# Patient Record
Sex: MALE | Race: WHITE
[De-identification: names, ages, dates, MRNs, and addresses within clinical notes are randomized per-mention and may not be internally consistent; named-entity substitution may affect disease eponyms.]

---

## 2017-05-10 ENCOUNTER — HOSPITAL ENCOUNTER (EMERGENCY)
Dept: HOSPITAL 35 - ER | Age: 82
LOS: 1 days | Discharge: HOME | End: 2017-05-11
Payer: COMMERCIAL

## 2017-05-10 VITALS — WEIGHT: 164.99 LBS | BODY MASS INDEX: 23.62 KG/M2 | HEIGHT: 70 IN

## 2017-05-10 DIAGNOSIS — F32.9: ICD-10-CM

## 2017-05-10 DIAGNOSIS — Z90.49: ICD-10-CM

## 2017-05-10 DIAGNOSIS — E03.9: ICD-10-CM
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
## 2017-05-10 DIAGNOSIS — Z85.46: ICD-10-CM
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
## 2017-05-10 DIAGNOSIS — Z98.890: ICD-10-CM

## 2017-05-10 DIAGNOSIS — F10.99: ICD-10-CM
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
## 2017-05-10 DIAGNOSIS — S20.212A: Primary | ICD-10-CM
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
## 2017-05-10 DIAGNOSIS — E11.9: ICD-10-CM

## 2020-11-05 ENCOUNTER — HOSPITAL ENCOUNTER (OUTPATIENT)
Dept: HOSPITAL 35 - RAD | Age: 85
End: 2020-11-05
Attending: FAMILY MEDICINE
Payer: COMMERCIAL

## 2020-11-05 DIAGNOSIS — R06.00: Primary | ICD-10-CM

## 2021-05-27 ENCOUNTER — HOSPITAL ENCOUNTER (EMERGENCY)
Dept: HOSPITAL 35 - ER | Age: 86
Discharge: HOME | End: 2021-05-27
Payer: COMMERCIAL

## 2021-05-27 VITALS — WEIGHT: 170 LBS | BODY MASS INDEX: 24.34 KG/M2 | HEIGHT: 70 IN

## 2021-05-27 VITALS — DIASTOLIC BLOOD PRESSURE: 58 MMHG | SYSTOLIC BLOOD PRESSURE: 121 MMHG

## 2021-05-27 DIAGNOSIS — E11.9: ICD-10-CM

## 2021-05-27 DIAGNOSIS — M54.2: ICD-10-CM

## 2021-05-27 DIAGNOSIS — R07.89: Primary | ICD-10-CM

## 2021-05-27 DIAGNOSIS — Z79.899: ICD-10-CM

## 2021-05-27 DIAGNOSIS — E03.9: ICD-10-CM

## 2021-05-27 LAB
ALBUMIN SERPL-MCNC: 3.8 G/DL (ref 3.4–5)
ALT SERPL-CCNC: 22 U/L (ref 16–63)
ANION GAP SERPL CALC-SCNC: 12 MMOL/L (ref 7–16)
AST SERPL-CCNC: 13 U/L (ref 15–37)
BASOPHILS NFR BLD AUTO: 1 % (ref 0–2)
BILIRUB SERPL-MCNC: 0.6 MG/DL (ref 0.2–1)
BUN SERPL-MCNC: 24 MG/DL (ref 7–18)
CALCIUM SERPL-MCNC: 9.3 MG/DL (ref 8.5–10.1)
CHLORIDE SERPL-SCNC: 103 MMOL/L (ref 98–107)
CO2 SERPL-SCNC: 26 MMOL/L (ref 21–32)
CREAT SERPL-MCNC: 1.7 MG/DL (ref 0.7–1.3)
EOSINOPHIL NFR BLD: 0.8 % (ref 0–3)
ERYTHROCYTE [DISTWIDTH] IN BLOOD BY AUTOMATED COUNT: 13.3 % (ref 10.5–14.5)
GLUCOSE SERPL-MCNC: 273 MG/DL (ref 74–106)
GRANULOCYTES NFR BLD MANUAL: 74.4 % (ref 36–66)
HCT VFR BLD CALC: 35.1 % (ref 42–52)
HGB BLD-MCNC: 12.1 GM/DL (ref 14–18)
LYMPHOCYTES NFR BLD AUTO: 18.1 % (ref 24–44)
MCH RBC QN AUTO: 30.8 PG (ref 26–34)
MCHC RBC AUTO-ENTMCNC: 34.5 G/DL (ref 28–37)
MCV RBC: 89.4 FL (ref 80–100)
MONOCYTES NFR BLD: 5.7 % (ref 1–8)
NEUTROPHILS # BLD: 6.3 THOU/UL (ref 1.4–8.2)
PLATELET # BLD: 208 THOU/UL (ref 150–400)
POTASSIUM SERPL-SCNC: 4.3 MMOL/L (ref 3.5–5.1)
PROT SERPL-MCNC: 7.2 G/DL (ref 6.4–8.2)
RBC # BLD AUTO: 3.92 MIL/UL (ref 4.5–6)
SODIUM SERPL-SCNC: 141 MMOL/L (ref 136–145)
TROPONIN I SERPL-MCNC: <0.06 NG/ML (ref ?–0.06)
WBC # BLD AUTO: 8.5 THOU/UL (ref 4–11)

## 2021-05-28 NOTE — EKG
[address] [employer]
[place]  [address]
Phone:  [phone]                    ELECTROCARDIOGRAM REPORT      
_______________________________________________________________________________
 
Name:       HECTOR NUÑEZ OLMAN            [facility] #:                     [id number]     Account #:      [id number]  
Admission:  21    Attend Phys:                          
Discharge:  21    Date of Birth:  [birth year]  
                                                          Report #: [id number]
   [id number]
_______________________________________________________________________________
                         [facility] ED
                                       
Test Date:    2021               Test Time:    15:48:02
Pat Name:     HECTOR NUÑEZ         Department:   
Patient ID:   [id number]            Room:          
Gender:       M                        Technician:   unknown
:          1935               Requested By: Zunilda Blum
Order Number: [id number] MD:   Win Larsen
                                 Measurements
Intervals                              Axis          
Rate:         73                       P:            48
VA:           243                      QRS:          24
QRSD:         89                       T:            26
QT:           392                                    
QTc:          432                                    
                           Interpretive Statements
Sinus rhythm
Prolonged VA interval
Inferior infarct, old
Compared to ECG 2017 12:11:11
First degree AV block now present
Myocardial infarct finding now present
Atrial premature complex(es) no longer present
Electronically Signed On 2021 6:52:05 CDT by Win Larsen
[email or web address]
 
 
 
 
 
 
 
 
 
 
 
 
 
 
 
 
 
 
  <ELECTRONICALLY SIGNED>
   By: Win Larsen MD, [facility]    
  21     0652
D: 21 1548                           _____________________________________
T: 21 1548                           Win Larsen MD, [facility]      /EPI